# Patient Record
Sex: FEMALE | Race: WHITE | Employment: OTHER | URBAN - NONMETROPOLITAN AREA
[De-identification: names, ages, dates, MRNs, and addresses within clinical notes are randomized per-mention and may not be internally consistent; named-entity substitution may affect disease eponyms.]

---

## 2022-12-07 ENCOUNTER — HOSPITAL ENCOUNTER (EMERGENCY)
Age: 67
Discharge: HOME | End: 2022-12-07
Attending: INTERNAL MEDICINE
Payer: COMMERCIAL

## 2022-12-07 ENCOUNTER — APPOINTMENT (OUTPATIENT)
Dept: CT IMAGING | Age: 67
End: 2022-12-07
Attending: INTERNAL MEDICINE
Payer: COMMERCIAL

## 2022-12-07 ENCOUNTER — APPOINTMENT (OUTPATIENT)
Dept: GENERAL RADIOLOGY | Age: 67
End: 2022-12-07
Attending: INTERNAL MEDICINE
Payer: COMMERCIAL

## 2022-12-07 VITALS
HEIGHT: 65 IN | HEART RATE: 73 BPM | WEIGHT: 240 LBS | RESPIRATION RATE: 16 BRPM | OXYGEN SATURATION: 99 % | DIASTOLIC BLOOD PRESSURE: 77 MMHG | SYSTOLIC BLOOD PRESSURE: 148 MMHG | BODY MASS INDEX: 39.99 KG/M2 | TEMPERATURE: 98.2 F

## 2022-12-07 DIAGNOSIS — S32.030A CLOSED COMPRESSION FRACTURE OF L3 LUMBAR VERTEBRA, INITIAL ENCOUNTER (HCC): Primary | ICD-10-CM

## 2022-12-07 PROCEDURE — 72131 CT LUMBAR SPINE W/O DYE: CPT

## 2022-12-07 PROCEDURE — 96372 THER/PROPH/DIAG INJ SC/IM: CPT

## 2022-12-07 PROCEDURE — 73700 CT LOWER EXTREMITY W/O DYE: CPT

## 2022-12-07 PROCEDURE — 73610 X-RAY EXAM OF ANKLE: CPT

## 2022-12-07 PROCEDURE — 99284 EMERGENCY DEPT VISIT MOD MDM: CPT

## 2022-12-07 PROCEDURE — 74011250636 HC RX REV CODE- 250/636: Performed by: INTERNAL MEDICINE

## 2022-12-07 RX ORDER — OXYBUTYNIN CHLORIDE 10 MG/1
10 TABLET, EXTENDED RELEASE ORAL DAILY
COMMUNITY

## 2022-12-07 RX ORDER — HYDROMORPHONE HYDROCHLORIDE 1 MG/ML
1 INJECTION, SOLUTION INTRAMUSCULAR; INTRAVENOUS; SUBCUTANEOUS
Status: COMPLETED | OUTPATIENT
Start: 2022-12-07 | End: 2022-12-07

## 2022-12-07 RX ORDER — LANOLIN ALCOHOL/MO/W.PET/CERES
400 CREAM (GRAM) TOPICAL DAILY
COMMUNITY

## 2022-12-07 RX ORDER — HYDROMORPHONE HYDROCHLORIDE 1 MG/ML
0.5 INJECTION, SOLUTION INTRAMUSCULAR; INTRAVENOUS; SUBCUTANEOUS
Status: COMPLETED | OUTPATIENT
Start: 2022-12-07 | End: 2022-12-07

## 2022-12-07 RX ORDER — GABAPENTIN 800 MG/1
TABLET ORAL 2 TIMES DAILY
COMMUNITY

## 2022-12-07 RX ORDER — CLONAZEPAM 2 MG/1
2 TABLET ORAL DAILY
COMMUNITY

## 2022-12-07 RX ORDER — OXYCODONE HYDROCHLORIDE 15 MG/1
15 TABLET ORAL
COMMUNITY

## 2022-12-07 RX ORDER — HYDROMORPHONE HYDROCHLORIDE 1 MG/ML
0.5 INJECTION, SOLUTION INTRAMUSCULAR; INTRAVENOUS; SUBCUTANEOUS
Status: DISCONTINUED | OUTPATIENT
Start: 2022-12-07 | End: 2022-12-07

## 2022-12-07 RX ADMIN — HYDROMORPHONE HYDROCHLORIDE 1 MG: 1 INJECTION, SOLUTION INTRAMUSCULAR; INTRAVENOUS; SUBCUTANEOUS at 16:12

## 2022-12-07 RX ADMIN — HYDROMORPHONE HYDROCHLORIDE 0.5 MG: 1 INJECTION, SOLUTION INTRAMUSCULAR; INTRAVENOUS; SUBCUTANEOUS at 18:44

## 2022-12-07 NOTE — ED NOTES
Pt crying in bed in hallway asking to go to bathroom, nurse to bedside attempting to assist pt to bathroom across hallway. Pt states that she can not get out of bed because she fell on her back and can not walk.  Explained to pt that she is in the hallway and will need to get out of bed to use restroom

## 2022-12-07 NOTE — ED TRIAGE NOTES
EMS called to [facility] in [place] for pt that fell in bathroom and hit back and hip on floor, pt able to ambulate to court room and then call ems

## 2022-12-07 NOTE — ED PROVIDER NOTES
EMERGENCY DEPARTMENT HISTORY AND PHYSICAL EXAM      Date: 12/7/2022  Patient Name: Claudell Ken    History of Presenting Illness     Chief Complaint   Patient presents with    Fall       History Provided By: Patient    HPI: Claudell Ken, 79 y.o. female with h/o chronic pain; bilat LE lymphedema; arthritis that went to court with her daughter and slipped and fell in the bathroom there. She states that she landed on her back; hit her right hip and twisted her left ankle. No head trauma; no neck pain. She was able to get up and walk to the court proceedings and later called EMS due to pain in her back; right hip and left ankle. Pt is on daily oxycodone and goes to a pain management clinic    There are no other complaints, changes, or physical findings at this time. Past History   Past Medical History:  Past Medical History:   Diagnosis Date    Arthritis     Chronic pain     Lipoedema        Past Surgical History:  Past Surgical History:   Procedure Laterality Date    HX GYN         Family History:  History reviewed. No pertinent family history. Social History:  Social History     Tobacco Use    Smoking status: Never    Smokeless tobacco: Never   Substance Use Topics    Alcohol use: Not Currently    Drug use: Not Currently       Allergies: Allergies   Allergen Reactions    Latex Other (comments)     burns    Morphine Hives    Biaxin [Clarithromycin] Rash       PCP: None    Current Facility-Administered Medications   Medication Dose Route Frequency Provider Last Rate Last Admin    HYDROmorphone (DILAUDID) injection 0.5 mg  0.5 mg IntraVENous NOW Antonia Yates MD         Current Outpatient Medications   Medication Sig Dispense Refill    gabapentin (NEURONTIN) 800 mg tablet Take  by mouth two (2) times a day. clonazePAM (KlonoPIN) 2 mg tablet Take 2 mg by mouth daily. oxyCODONE IR (OXY-IR) 15 mg immediate release tablet Take 15 mg by mouth six (6) times daily.       folic acid 400 mcg tablet Take 400 mcg by mouth daily. oxybutynin chloride XL (DITROPAN XL) 10 mg CR tablet Take 10 mg by mouth daily. Review of Systems   Review of Systems   Constitutional:  Negative for chills and fever. HENT:  Negative for sore throat. Eyes:  Negative for visual disturbance. Cardiovascular:  Negative for chest pain. Gastrointestinal:  Negative for abdominal pain. Genitourinary:  Negative for dysuria and flank pain. Musculoskeletal:  Positive for back pain and myalgias. Negative for neck pain and neck stiffness. Skin:  Negative for wound. Neurological:  Negative for light-headedness, numbness and headaches. Psychiatric/Behavioral:  Negative for confusion. Physical Exam   Physical Exam  Vitals and nursing note reviewed. Constitutional:       General: She is not in acute distress. Appearance: She is well-developed. Comments: Heavily tattooed; crying in pain   HENT:      Head: Atraumatic. Comments: Neg Singh; Raccoon; CSF rhinorrhea  Eyes:      Extraocular Movements: Extraocular movements intact. Conjunctiva/sclera: Conjunctivae normal.   Neck:      Thyroid: No thyromegaly. Vascular: No JVD. Cardiovascular:      Rate and Rhythm: Normal rate and regular rhythm. Heart sounds: Normal heart sounds. No murmur heard. Pulmonary:      Effort: Pulmonary effort is normal. No respiratory distress. Breath sounds: Normal breath sounds. No wheezing. Abdominal:      General: Bowel sounds are normal. There is no distension. Palpations: Abdomen is soft. Tenderness: There is no abdominal tenderness. Musculoskeletal:      Cervical back: Neck supple. No tenderness. Comments: Bilat LE lymphedema; TTP left ankle w/o swelling or instability; pain in the lower lumbar spine to palpation; ROM right hip is painful   Skin:     General: Skin is warm. Findings: No erythema.    Neurological:      Mental Status: She is alert and oriented to person, place, and time. Psychiatric:         Behavior: Behavior normal.       Lab and Diagnostic Study Results   Labs -   No results found for this or any previous visit (from the past 12 hour(s)). Radiologic Studies -   [unfilled]  CT Results  (Last 48 hours)                 12/07/22 1517  CT SPINE LUMB WO CONT Final result    Impression:          1. Mild acute compression deformity of the superior endplate of L3 without   evidence of retropulsion. > Diffuse osteopenia.      > No additional compression fracture demonstrated. 2. Spondylosis and advanced lower lumbar predominant facet arthropathy with   likely at least moderate osseous canal narrowing at the L4-L5 level. Narrative:  EXAM: CT SPINE LUMB WO CONT       CLINICAL INDICATION/HISTORY: fall with low back pain     > Additional: None. COMPARISON: None. TECHNIQUE: CT lumbar spine performed in the axial plane utilizing both bone and   soft tissue reconstruction algorithms. Standard coronal and sagittal reformatted   images were performed by the technologist and are included in interpretation. One or more dose reduction techniques were used on this CT: automated exposure   control, adjustment of the mAs and/or kVp according to patient size, and   iterative reconstruction techniques. The specific techniques used on this CT   exam have been documented in the patient's electronic medical record. Digital   Imaging and Communications in Medicine (DICOM) format image data are available   to nonaffiliated external healthcare facilities or entities on a secure, media   free, reciprocally searchable basis with patient authorization for at least a   12-month period after this study. _______________       FINDINGS:      > Diffuse osteopenia. Coronal reformatted images demonstrate no significant rotatory curvature to the   lumbar spine.  Sagittal reformatted imaging demonstrates maintenance of usual   lumbar lordosis without listhesis. Mild acute compression fracture of the superior endplate of L3 is noted with   central depression and anterior cortical break. No appreciable retropulsion   demonstrated. No posterior element extension. Remaining vertebral body heights   appear within normal limits. Multilevel lumbar spondylosis, greatest in conspicuity at the L4-L5 and L5-S1   levels. Lower lumbar predominant facet arthropathy also noted, severe spanning   the L3-S1 segments. Correlation of axial and sagittal imaging demonstrates area of high-grade   osseous canal stenosis. Likely at least moderate osseous canal narrowing at   L4-L5 secondary to combination of disc bulge as well as advanced facet   arthropathy. Retroperitoneal soft tissue structures demonstrate scattered atherosclerotic   calcifications. Included sacrum is in intact.       _______________           12/07/22 1517  CT HIP RT WO CONT Final result    Impression:          1. No CT evidence of displaced femoral fracture. 2. Moderate severity right hip joint osteoarthritis is present along with   stigmata of right-sided gluteal tendinosis. 3. Given the magnitude of osteopenia, if there are persistent symptoms of right   hip pain and limited weight-bearing tolerance, consider MRI for further   evaluation. Narrative:  EXAM: CT HIP RT WO CONT       CLINICAL INDICATION/HISTORY: fall with right hip pain     > Additional: None. COMPARISON: None. TECHNIQUE: CT imaging of the right hip was performed in the axial plane   utilizing both bone and soft tissue reconstruction algorithms. Standard coronal   and sagittal reformatted images were performed by the technologist and are   included in interpretation. One or more dose reduction techniques were used on this CT: automated exposure   control, adjustment of the mAs and/or kVp according to patient size, and   iterative reconstruction techniques.   The specific techniques used on this CT   exam have been documented in the patient's electronic medical record. Digital   Imaging and Communications in Medicine (DICOM) format image data are available   to nonaffiliated external healthcare facilities or entities on a secure, media   free, reciprocally searchable basis with patient authorization for at least a   12-month period after this study. _______________       FINDINGS:      >Diffuse osteopenia             > Included portions of the sacrum and right SI joint are intact. Both anterior   and posterior acetabular columns appear intact.     > Alignment at the right hip joint is within normal limits. > There is moderate severity right hip joint osteoarthritis demonstrated. No   evidence of proximal femoral fracture. Heterotopic ossification is seen at the   gluteal tendon insertions in keeping with sequela of gluteal tendinosis. > Subtrochanteric portions of the femur appear intact. Muscle bulk of the right thigh appears within normal limits for patient age. No   evidence of hematoma or fluid collection. _______________                 CXR Results  (Last 48 hours)      None            Medical Decision Making and ED Course   Differential Diagnosis & Medical Decision Making Provider Note:   Ground level fall with back; right hip and left ankle injury    - I am the first and primary provider for this patient. I reviewed the vital signs, available nursing notes, past medical history, past surgical history, family history and social history. The patient's presenting problems have been discussed, and the staff are in agreement with the care plan formulated and outlined with them. I have encouraged them to ask questions as they arise throughout their visit. Vital Signs-Reviewed the patient's vital signs.   Patient Vitals for the past 12 hrs:   Temp Pulse Resp BP SpO2   12/07/22 1432 -- -- -- (!) 148/77 --   12/07/22 1330 98.2 °F (36.8 °C) 73 16 -- 99 % EKG interpretation: (Preliminary): EKG Interpreted by me. Shows     ED Course:      Procedures and Critical Care     Disposition   Disposition: Discharge    DISCHARGE PLAN:  1. Current Discharge Medication List        CONTINUE these medications which have NOT CHANGED    Details   gabapentin (NEURONTIN) 800 mg tablet Take  by mouth two (2) times a day. clonazePAM (KlonoPIN) 2 mg tablet Take 2 mg by mouth daily. oxyCODONE IR (OXY-IR) 15 mg immediate release tablet Take 15 mg by mouth six (6) times daily. folic acid 958 mcg tablet Take 400 mcg by mouth daily. oxybutynin chloride XL (DITROPAN XL) 10 mg CR tablet Take 10 mg by mouth daily. 2.   Follow-up Information       Follow up With Specialties Details Why Contact Info      Schedule an appointment as soon as possible for a visit in 1 day  FOLLOW UP WITH YOUR PAIN MANAGEMENT          3. Return to ED if worse   4. Current Discharge Medication List        Diagnosis/Clinical Impression     Clinical Impression:   1. Closed compression fracture of L3 lumbar vertebra, initial encounter ([facility])        Attestations: Mar Copeland MD, am the primary clinician of record. Please note that this dictation was completed with [employer], the [employer] voice recognition software. Quite often unanticipated grammatical, syntax, homophones, and other interpretive errors are inadvertently transcribed by the computer software. Please disregard these errors. Please excuse any errors that have escaped final proofreading. Thank you.